# Patient Record
Sex: FEMALE | Race: WHITE | NOT HISPANIC OR LATINO | Employment: STUDENT | URBAN - METROPOLITAN AREA
[De-identification: names, ages, dates, MRNs, and addresses within clinical notes are randomized per-mention and may not be internally consistent; named-entity substitution may affect disease eponyms.]

---

## 2023-11-27 ENCOUNTER — MEDICAL CORRESPONDENCE (OUTPATIENT)
Dept: HEALTH INFORMATION MANAGEMENT | Facility: CLINIC | Age: 23
End: 2023-11-27
Payer: COMMERCIAL

## 2023-11-28 ENCOUNTER — OFFICE VISIT (OUTPATIENT)
Dept: NEUROLOGY | Facility: CLINIC | Age: 23
End: 2023-11-28
Attending: PHYSICIAN ASSISTANT
Payer: COMMERCIAL

## 2023-11-28 ENCOUNTER — TRANSCRIBE ORDERS (OUTPATIENT)
Dept: OTHER | Age: 23
End: 2023-11-28

## 2023-11-28 VITALS
OXYGEN SATURATION: 98 % | HEART RATE: 66 BPM | DIASTOLIC BLOOD PRESSURE: 68 MMHG | WEIGHT: 140 LBS | SYSTOLIC BLOOD PRESSURE: 104 MMHG

## 2023-11-28 DIAGNOSIS — G44.309 POST-CONCUSSION HEADACHE: ICD-10-CM

## 2023-11-28 DIAGNOSIS — G44.309 POST-CONCUSSION HEADACHE: Primary | ICD-10-CM

## 2023-11-28 PROCEDURE — 99205 OFFICE O/P NEW HI 60 MIN: CPT | Performed by: PSYCHIATRY & NEUROLOGY

## 2023-11-28 ASSESSMENT — HEADACHE IMPACT TEST (HIT 6)
WHEN YOU HAVE A HEADACHE HOW OFTEN DO YOU WISH YOU COULD LIE DOWN: ALWAYS
HOW OFTEN DO HEADACHES LIMIT YOUR DAILY ACTIVITIES: VERY OFTEN
HIT6 TOTAL SCORE: 67
HOW OFTEN HAVE YOU FELT FED UP OR IRRITATED BECAUSE OF YOUR HEADACHES: SOMETIMES
HOW OFTEN DID HEADACHS LIMIT CONCENTRATION ON WORK OR DAILY ACTIVITY: VERY OFTEN
HOW OFTEN HAVE YOU FELT TOO TIRED TO WORK BECAUSE OF YOUR HEADACHES: VERY OFTEN
WHEN YOU HAVE HEADACHES HOW OFTEN IS THE PAIN SEVERE: VERY OFTEN
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
## 2023-12-04 ENCOUNTER — HOSPITAL ENCOUNTER (EMERGENCY)
Facility: CLINIC | Age: 23
Discharge: HOME | End: 2023-12-04
Attending: STUDENT IN AN ORGANIZED HEALTH CARE EDUCATION/TRAINING PROGRAM
Payer: COMMERCIAL

## 2023-12-04 ENCOUNTER — APPOINTMENT (OUTPATIENT)
Dept: CT IMAGING | Facility: CLINIC | Age: 23
End: 2023-12-04
Payer: COMMERCIAL

## 2023-12-04 VITALS
TEMPERATURE: 98.3 F | OXYGEN SATURATION: 99 % | HEART RATE: 69 BPM | RESPIRATION RATE: 16 BRPM | DIASTOLIC BLOOD PRESSURE: 67 MMHG | SYSTOLIC BLOOD PRESSURE: 113 MMHG

## 2023-12-04 DIAGNOSIS — G43.709 CHRONIC MIGRAINE WITHOUT AURA WITHOUT STATUS MIGRAINOSUS, NOT INTRACTABLE: ICD-10-CM

## 2023-12-04 LAB
ALBUMIN SERPL BCG-MCNC: 5 G/DL (ref 3.5–5.2)
ALP SERPL-CCNC: 40 U/L (ref 40–150)
ALT SERPL W P-5'-P-CCNC: 14 U/L (ref 0–50)
ANION GAP SERPL CALCULATED.3IONS-SCNC: 11 MMOL/L (ref 7–15)
AST SERPL W P-5'-P-CCNC: 22 U/L (ref 0–45)
BASOPHILS # BLD AUTO: 0 10E3/UL (ref 0–0.2)
BASOPHILS NFR BLD AUTO: 1 %
BILIRUB SERPL-MCNC: 0.9 MG/DL
BUN SERPL-MCNC: 10.2 MG/DL (ref 6–20)
CALCIUM SERPL-MCNC: 9.7 MG/DL (ref 8.6–10)
CHLORIDE SERPL-SCNC: 106 MMOL/L (ref 98–107)
CREAT SERPL-MCNC: 0.74 MG/DL (ref 0.51–0.95)
DEPRECATED HCO3 PLAS-SCNC: 26 MMOL/L (ref 22–29)
EGFRCR SERPLBLD CKD-EPI 2021: >90 ML/MIN/1.73M2
EOSINOPHIL # BLD AUTO: 0.1 10E3/UL (ref 0–0.7)
EOSINOPHIL NFR BLD AUTO: 2 %
ERYTHROCYTE [DISTWIDTH] IN BLOOD BY AUTOMATED COUNT: 12.3 % (ref 10–15)
GLUCOSE SERPL-MCNC: 91 MG/DL (ref 70–99)
HCG INTACT+B SERPL-ACNC: <1 MIU/ML
HCT VFR BLD AUTO: 43.1 % (ref 35–47)
HGB BLD-MCNC: 14.5 G/DL (ref 11.7–15.7)
IMM GRANULOCYTES # BLD: 0 10E3/UL
IMM GRANULOCYTES NFR BLD: 0 %
LYMPHOCYTES # BLD AUTO: 2.1 10E3/UL (ref 0.8–5.3)
LYMPHOCYTES NFR BLD AUTO: 31 %
MAGNESIUM SERPL-MCNC: 2.2 MG/DL (ref 1.7–2.3)
MCH RBC QN AUTO: 30.4 PG (ref 26.5–33)
MCHC RBC AUTO-ENTMCNC: 33.6 G/DL (ref 31.5–36.5)
MCV RBC AUTO: 90 FL (ref 78–100)
MONOCYTES # BLD AUTO: 0.4 10E3/UL (ref 0–1.3)
MONOCYTES NFR BLD AUTO: 7 %
NEUTROPHILS # BLD AUTO: 4 10E3/UL (ref 1.6–8.3)
NEUTROPHILS NFR BLD AUTO: 59 %
NRBC # BLD AUTO: 0 10E3/UL
NRBC BLD AUTO-RTO: 0 /100
PLATELET # BLD AUTO: 269 10E3/UL (ref 150–450)
POTASSIUM SERPL-SCNC: 3.7 MMOL/L (ref 3.4–5.3)
PROT SERPL-MCNC: 7.6 G/DL (ref 6.4–8.3)
RBC # BLD AUTO: 4.77 10E6/UL (ref 3.8–5.2)
SODIUM SERPL-SCNC: 143 MMOL/L (ref 135–145)
WBC # BLD AUTO: 6.7 10E3/UL (ref 4–11)

## 2023-12-04 PROCEDURE — 84702 CHORIONIC GONADOTROPIN TEST: CPT

## 2023-12-04 PROCEDURE — 83735 ASSAY OF MAGNESIUM: CPT

## 2023-12-04 PROCEDURE — 85025 COMPLETE CBC W/AUTO DIFF WBC: CPT

## 2023-12-04 PROCEDURE — 96375 TX/PRO/DX INJ NEW DRUG ADDON: CPT | Mod: 59

## 2023-12-04 PROCEDURE — 36415 COLL VENOUS BLD VENIPUNCTURE: CPT

## 2023-12-04 PROCEDURE — 99284 EMERGENCY DEPT VISIT MOD MDM: CPT

## 2023-12-04 PROCEDURE — 250N000011 HC RX IP 250 OP 636: Performed by: STUDENT IN AN ORGANIZED HEALTH CARE EDUCATION/TRAINING PROGRAM

## 2023-12-04 PROCEDURE — 70496 CT ANGIOGRAPHY HEAD: CPT

## 2023-12-04 PROCEDURE — 250N000013 HC RX MED GY IP 250 OP 250 PS 637

## 2023-12-04 PROCEDURE — 82310 ASSAY OF CALCIUM: CPT

## 2023-12-04 PROCEDURE — 250N000011 HC RX IP 250 OP 636

## 2023-12-04 PROCEDURE — 82374 ASSAY BLOOD CARBON DIOXIDE: CPT

## 2023-12-04 PROCEDURE — 70496 CT ANGIOGRAPHY HEAD: CPT | Mod: 26 | Performed by: RADIOLOGY

## 2023-12-04 PROCEDURE — 96374 THER/PROPH/DIAG INJ IV PUSH: CPT | Mod: 59

## 2023-12-04 PROCEDURE — 96361 HYDRATE IV INFUSION ADD-ON: CPT

## 2023-12-04 PROCEDURE — 258N000003 HC RX IP 258 OP 636

## 2023-12-04 PROCEDURE — 70450 CT HEAD/BRAIN W/O DYE: CPT

## 2023-12-04 PROCEDURE — 99285 EMERGENCY DEPT VISIT HI MDM: CPT | Mod: 25

## 2023-12-04 PROCEDURE — 70496 CT ANGIOGRAPHY HEAD: CPT | Mod: XS

## 2023-12-04 RX ORDER — DIPHENHYDRAMINE HYDROCHLORIDE 50 MG/ML
25 INJECTION INTRAMUSCULAR; INTRAVENOUS ONCE
Status: COMPLETED | OUTPATIENT
Start: 2023-12-04 | End: 2023-12-04

## 2023-12-04 RX ORDER — ACETAMINOPHEN 500 MG
1000 TABLET ORAL ONCE
Status: COMPLETED | OUTPATIENT
Start: 2023-12-04 | End: 2023-12-04

## 2023-12-04 RX ORDER — IOPAMIDOL 755 MG/ML
90 INJECTION, SOLUTION INTRAVASCULAR ONCE
Status: COMPLETED | OUTPATIENT
Start: 2023-12-04 | End: 2023-12-04

## 2023-12-04 RX ADMIN — DIPHENHYDRAMINE HYDROCHLORIDE 25 MG: 50 INJECTION, SOLUTION INTRAMUSCULAR; INTRAVENOUS at 13:03

## 2023-12-04 RX ADMIN — ACETAMINOPHEN 1000 MG: 325 TABLET, FILM COATED ORAL at 12:56

## 2023-12-04 RX ADMIN — IOPAMIDOL 67 ML: 755 INJECTION, SOLUTION INTRAVENOUS at 14:33

## 2023-12-04 RX ADMIN — PROCHLORPERAZINE EDISYLATE 10 MG: 5 INJECTION INTRAMUSCULAR; INTRAVENOUS at 13:01

## 2023-12-04 RX ADMIN — SODIUM CHLORIDE 1000 ML: 9 INJECTION, SOLUTION INTRAVENOUS at 13:00

## 2023-12-04 ASSESSMENT — ACTIVITIES OF DAILY LIVING (ADL)
ADLS_ACUITY_SCORE: 35
ADLS_ACUITY_SCORE: 35

## 2023-12-04 NOTE — ED TRIAGE NOTES
Pt arrives ambulatory to with complaint of migraine on left side of head behind eye - last night reports that her vision was more spotty, has since resolved but not 100%.     Hx: most recent concussion in July (total of 6) - has constant headaches. Recently has been getting worse overnight.

## 2023-12-04 NOTE — ED PROVIDER NOTES
ED Provider Note  [facility]      History     Chief Complaint   Patient presents with    Headache     The history is provided by the patient. No  was used.     Kavita Hansen is a 23 year old female who presents to the ED with complaints of worsening left-sided migraine headache.  Past medical history notable for sixth diagnosed concussion in 7/4/2023.  She states that she has had a chronic left-sided headache since this head injury.  At that time, she got hit on the left side of her head by a volleyball which caused her concussion.  She reports worsening over the past 4 weeks with significant, severe worsening since 11 PM last night.  She contacted her neurologist who recommended she seek evaluation in the ED for worsening migraine headache.  She reports bilateral temporal vision loss since the incident in July with some worsening over the past couple of days.  She also reports chronic, left inferior decrease in vision since the head injury in July.  She otherwise denies any acute vision loss or blurry vision.  She does not use corrective lenses or contacts.  She denies any nausea, vomiting, fever, or chills.  She denies ear fullness, balance issues, is otherwise oriented x 4 and able to ambulate and move her extremities at baseline.  She denies any numbness, tingling, weakness into her upper or lower extremities.  She last had Advil yesterday with no improvement of her symptoms.    Past Medical History  No past medical history on file.  No past surgical history on file.  No current outpatient medications on file.    Allergies   Allergen Reactions    Amoxicillin Hives     Family History  No family history on file.  Social History   Social History     Tobacco Use    Smoking status: Never    Smokeless tobacco: Never   Substance Use Topics    Alcohol use: Never    Drug use: Never      Past medical history, past surgical history, medications, allergies, family history,  and social history were reviewed with the patient. No additional pertinent items.      A medically appropriate review of systems was performed with pertinent positives and negatives noted in the HPI, and all other systems negative.    Physical Exam   BP: 106/66  Pulse: 78  Temp: 98.3  F (36.8  C)  Resp: 18  SpO2: 97 %  Physical Exam  Constitutional:       General: She is not in acute distress.     Appearance: Normal appearance. She is normal weight. She is not ill-appearing, toxic-appearing or diaphoretic.   HENT:      Head: Normocephalic.      Right Ear: Tympanic membrane and ear canal normal. There is no impacted cerumen.      Left Ear: Tympanic membrane and ear canal normal. There is no impacted cerumen.      Mouth/Throat:      Mouth: Mucous membranes are moist.      Pharynx: Oropharynx is clear.   Eyes:      Extraocular Movements: Extraocular movements intact.      Pupils: Pupils are equal, round, and reactive to light.   Cardiovascular:      Rate and Rhythm: Normal rate and regular rhythm.      Pulses: Normal pulses.   Pulmonary:      Effort: Pulmonary effort is normal.      Breath sounds: Normal breath sounds.   Abdominal:      General: Abdomen is flat.      Palpations: Abdomen is soft.   Musculoskeletal:         General: Normal range of motion.   Skin:     General: Skin is warm.      Capillary Refill: Capillary refill takes less than 2 seconds.   Neurological:      General: No focal deficit present.      Mental Status: She is alert and oriented to person, place, and time. Mental status is at baseline.      Cranial Nerves: No cranial nerve deficit.      Sensory: No sensory deficit.      Motor: No weakness.   Psychiatric:         Mood and Affect: Mood normal.         Behavior: Behavior normal.           ED Course, Procedures, & Data      Procedures                Results for orders placed or performed during the hospital encounter of 12/04/23   CT Head w/o Contrast     Status: None    Narrative    CT HEAD W/O  CONTRAST 12/4/2023 3:11 PM    History: worsening migraine headache     Comparison: none available     Technique: Using multidetector thin collimation helical acquisition  technique, axial, coronal and sagittal CT images from the skull base  to the vertex were obtained without intravenous contrast.   (topogram) image(s) also obtained and reviewed.    Findings:   There is no intracranial hemorrhage, mass effect, or midline shift.  Gray/white matter differentiation in both cerebral hemispheres is  preserved. Ventricles are proportionate to the cerebral sulci. The  basal cisterns are clear.    The bony calvaria and the bones of the skull base are normal. The  visualized portions of the paranasal sinuses and mastoid air cells are  clear.       Impression    Impression: No acute intracranial pathology.     I have personally reviewed the examination and initial interpretation  and I agree with the findings.    TERRELL WEST MD         SYSTEM ID:  [id number]   CTA Head with Contrast     Status: None    Narrative    CTA HEAD WITH CONTRAST, CTV HEAD WITH CONTRAST 12/4/2023 3:20 PM    CT angiogram of the head with contrast  Reconstruction on the 3D workstation    History: worsening headache; bilateral temporal vision changes;  concussion in July    Comparison:  same day head CT    Technique:     Following rapid bolus intravenous injection of nonionic contrast  material, helical images were obtained using thin collimation  multidetector helical technique from the base of the skull through the  [place]. This CT angiogram data was reconstructed at thin  intervals with mild overlap. Images were sent to the [employer]  workstation, and 3D and multiplanar reconstructions were performed by  the technologist. The source images, multiplanar reformations, and 3D  reconstructions in both maximum intensity projection display and  volume rendered models were reviewed.    Post intravenous contrast imaging was obtained with thin  sections from  the skull base through the vertex with a delay for venogram purposes.  Images were reviewed on the 3D workstation and manipulated.    Findings:      No aneurysm or stenosis of the major intracranial arteries at the base  of the brain. Anterior communicating artery is patent. Bilateral  posterior communicating arteries are not visualized.    Head CTV demonstrates no definite occlusion or thrombus within the  major dural and deep intracranial venous sinuses.      Impression    Impression:     1. No aneurysm or stenosis of the major intracranial arteries.  2. Normal CT venogram of the head.    I have personally reviewed the examination and initial interpretation  and I agree with the findings.    TERRELL WEST MD         SYSTEM ID:  [id number]   CTV Head with Contrast     Status: None    Narrative    CTA HEAD WITH CONTRAST, CTV HEAD WITH CONTRAST 12/4/2023 3:20 PM    CT angiogram of the head with contrast  Reconstruction on the 3D workstation    History: worsening headache; bilateral temporal vision changes;  concussion in July    Comparison:  same day head CT    Technique:     Following rapid bolus intravenous injection of nonionic contrast  material, helical images were obtained using thin collimation  multidetector helical technique from the base of the skull through the  [place]. This CT angiogram data was reconstructed at thin  intervals with mild overlap. Images were sent to the [employer]  workstation, and 3D and multiplanar reconstructions were performed by  the technologist. The source images, multiplanar reformations, and 3D  reconstructions in both maximum intensity projection display and  volume rendered models were reviewed.    Post intravenous contrast imaging was obtained with thin sections from  the skull base through the vertex with a delay for venogram purposes.  Images were reviewed on the 3D workstation and manipulated.    Findings:      No aneurysm or stenosis of the major  intracranial arteries at the base  of the brain. Anterior communicating artery is patent. Bilateral  posterior communicating arteries are not visualized.    Head CTV demonstrates no definite occlusion or thrombus within the  major dural and deep intracranial venous sinuses.      Impression    Impression:     1. No aneurysm or stenosis of the major intracranial arteries.  2. Normal CT venogram of the head.    I have personally reviewed the examination and initial interpretation  and I agree with the findings.    TERRELL WEST MD         SYSTEM ID:  [id number]   Comprehensive metabolic panel     Status: Normal   Result Value Ref Range    Sodium 143 135 - 145 mmol/L    Potassium 3.7 3.4 - 5.3 mmol/L    Carbon Dioxide (CO2) 26 22 - 29 mmol/L    Anion Gap 11 7 - 15 mmol/L    Urea Nitrogen 10.2 6.0 - 20.0 mg/dL    Creatinine 0.74 0.51 - 0.95 mg/dL    GFR Estimate >90 >60 mL/min/1.73m2    Calcium 9.7 8.6 - 10.0 mg/dL    Chloride 106 98 - 107 mmol/L    Glucose 91 70 - 99 mg/dL    Alkaline Phosphatase 40 40 - 150 U/L    AST 22 0 - 45 U/L    ALT 14 0 - 50 U/L    Protein Total 7.6 6.4 - 8.3 g/dL    Albumin 5.0 3.5 - 5.2 g/dL    Bilirubin Total 0.9 <=1.2 mg/dL   HCG quantitative pregnancy     Status: Normal   Result Value Ref Range    hCG Quantitative <1 <5 mIU/mL   Magnesium     Status: Normal   Result Value Ref Range    Magnesium 2.2 1.7 - 2.3 mg/dL   CBC with platelets and differential     Status: None   Result Value Ref Range    WBC Count 6.7 4.0 - 11.0 10e3/uL    RBC Count 4.77 3.80 - 5.20 10e6/uL    Hemoglobin 14.5 11.7 - 15.7 g/dL    Hematocrit 43.1 35.0 - 47.0 %    MCV 90 78 - 100 fL    MCH 30.4 26.5 - 33.0 pg    MCHC 33.6 31.5 - 36.5 g/dL    RDW 12.3 10.0 - 15.0 %    Platelet Count 269 150 - 450 10e3/uL    % Neutrophils 59 %    % Lymphocytes 31 %    % Monocytes 7 %    % Eosinophils 2 %    % Basophils 1 %    % Immature Granulocytes 0 %    NRBCs per 100 WBC 0 <1 /100    Absolute Neutrophils 4.0 1.6 - 8.3 10e3/uL     Absolute Lymphocytes 2.1 0.8 - 5.3 10e3/uL    Absolute Monocytes 0.4 0.0 - 1.3 10e3/uL    Absolute Eosinophils 0.1 0.0 - 0.7 10e3/uL    Absolute Basophils 0.0 0.0 - 0.2 10e3/uL    Absolute Immature Granulocytes 0.0 <=0.4 10e3/uL    Absolute NRBCs 0.0 10e3/uL   CBC with platelets differential     Status: None    Narrative    The following orders were created for panel order CBC with platelets differential.  Procedure                               Abnormality         Status                     ---------                               -----------         ------                     CBC with platelets and d...[[id number]]                      Final result                 Please view results for these tests on the individual orders.     Medications   sodium chloride 0.9% BOLUS 1,000 mL (0 mLs Intravenous Stopped 12/4/23 1508)   diphenhydrAMINE (BENADRYL) injection 25 mg (25 mg Intravenous $Given 12/4/23 1303)   prochlorperazine (COMPAZINE) injection 10 mg (10 mg Intravenous $Given 12/4/23 1301)   acetaminophen (TYLENOL) tablet 1,000 mg (1,000 mg Oral $Given 12/4/23 1256)   iopamidol (ISOVUE-370) solution 90 mL (67 mLs Intravenous $Given 12/4/23 1433)   sodium chloride (PF) 0.9% PF flush 90 mL (90 mLs Intravenous $Given 12/4/23 1433)     Labs Ordered and Resulted from Time of ED Arrival to Time of ED Departure   COMPREHENSIVE METABOLIC PANEL - Normal       Result Value    Sodium 143      Potassium 3.7      Carbon Dioxide (CO2) 26      Anion Gap 11      Urea Nitrogen 10.2      Creatinine 0.74      GFR Estimate >90      Calcium 9.7      Chloride 106      Glucose 91      Alkaline Phosphatase 40      AST 22      ALT 14      Protein Total 7.6      Albumin 5.0      Bilirubin Total 0.9     HCG QUANTITATIVE PREGNANCY - Normal    hCG Quantitative <1     MAGNESIUM - Normal    Magnesium 2.2     CBC WITH PLATELETS AND DIFFERENTIAL    WBC Count 6.7      RBC Count 4.77      Hemoglobin 14.5      Hematocrit 43.1      MCV 90      MCH 30.4       MCHC 33.6      RDW 12.3      Platelet Count 269      % Neutrophils 59      % Lymphocytes 31      % Monocytes 7      % Eosinophils 2      % Basophils 1      % Immature Granulocytes 0      NRBCs per 100 WBC 0      Absolute Neutrophils 4.0      Absolute Lymphocytes 2.1      Absolute Monocytes 0.4      Absolute Eosinophils 0.1      Absolute Basophils 0.0      Absolute Immature Granulocytes 0.0      Absolute NRBCs 0.0       CTV Head with Contrast   Final Result   Impression:       1. No aneurysm or stenosis of the major intracranial arteries.   2. Normal CT venogram of the head.      I have personally reviewed the examination and initial interpretation   and I agree with the findings.      TERRELL WEST MD            SYSTEM ID:  [id number]      CTA Head with Contrast   Final Result   Impression:       1. No aneurysm or stenosis of the major intracranial arteries.   2. Normal CT venogram of the head.      I have personally reviewed the examination and initial interpretation   and I agree with the findings.      TERRELL WEST MD            SYSTEM ID:  [id number]      CT Head w/o Contrast   Final Result   Impression: No acute intracranial pathology.       I have personally reviewed the examination and initial interpretation   and I agree with the findings.      TERRELL WEST MD            SYSTEM ID:  [id number]             Critical care was not performed.     Medical Decision Making  The patient's presentation was of moderate complexity (a chronic illness mild to moderate exacerbation, progression, or side effect of treatment).    The patient's evaluation involved:  ordering and/or review of 3+ test(s) in this encounter (see separate area of note for details)    The patient's management necessitated moderate risk (prescription drug management including medications given in the ED).    Assessment & Plan    Kavita is a 23-year-old female that presented for worsening left-sided migraine headache.  Acceptable vital signs.   Patient in no acute distress nontoxic-appearing.  No neurodeficits on exam.  Oriented x 4.  No gait disturbance or balance issues.  Labs unremarkable within normal limits.  hCG negative.  CT head without contrast negative for acute intracranial abnormalities.  CTA and CTV head for aneurysm or stenosis of the major intracranial arteries as well as normal CT venogram of the head.  Patient had improvement of headache symptoms with IV NS bolus, IV Benadryl, IV Compazine, and p.o. Tylenol.  She continued to report bilateral temporal peripheral vision changes but this has been ongoing since her diagnosed concussion in July 2023.  No urgent or emergent process on imaging or lab work at this time in the ED.  Patient stable for discharge home.  Strict return precautions given.  Continue at home medication regimen for headache.  Follow-up closely with your neurologist this week for further discussion of symptom management as well as imaging that is scheduled for this Saturday.  Recommend proceeding with MRI and MRV of the head on Saturday unless otherwise told by your neurologist.  Patient was agreeable to the discharge treatment plan, voiced understanding of the plan as well as imaging and lab results, and all questions answered prior to discharge    I have reviewed the nursing notes. I have reviewed the findings, diagnosis, plan and need for follow up with the patient.    There are no discharge medications for this patient.      Final diagnoses:   Chronic migraine without aura without status migrainosus, not intractable     EMILY Jackson MD  [facility] EMERGENCY DEPARTMENT  12/4/2023     Nya Cai PA-C  12/04/23 2048    --    ED Attending Physician Attestation    I Cherry Long MD, cared for this patient with the Advanced Practice Provider (JANIE). I have performed a history and physical examination of the patient independent of the JANEI. I reviewed the JANIE's documentation above  and agree with the documented findings and plan of care. I personally provided a substantive portion of the care for this patient, including the complete Medical Decision Making. Please see the JANIE's documentation for full details.      Cherry Long MD  Emergency Medicine        Cherry Long MD  12/05/23 1005

## 2023-12-04 NOTE — DISCHARGE INSTRUCTIONS
Continue at home symptomatic treatment for your migraine headache  Touch base with your neurologist this week to discuss further management of your chronic migraine headache  Proceed with imaging on Saturday as scheduled or if directed differently from your neurologist  Return to the ED if any worsening or concerning signs or symptoms present

## 2023-12-09 ENCOUNTER — HOSPITAL ENCOUNTER (OUTPATIENT)
Dept: MRI IMAGING | Facility: CLINIC | Age: 23
Discharge: HOME | End: 2023-12-09
Attending: PSYCHIATRY & NEUROLOGY | Admitting: PSYCHIATRY & NEUROLOGY
Payer: COMMERCIAL

## 2023-12-09 DIAGNOSIS — G44.309 POST-CONCUSSION HEADACHE: ICD-10-CM

## 2023-12-09 PROCEDURE — 255N000002 HC RX 255 OP 636: Mod: JZ | Performed by: PSYCHIATRY & NEUROLOGY

## 2023-12-09 PROCEDURE — 70553 MRI BRAIN STEM W/O & W/DYE: CPT | Mod: 26 | Performed by: RADIOLOGY

## 2023-12-09 PROCEDURE — 70546 MR ANGIOGRAPH HEAD W/O&W/DYE: CPT

## 2023-12-09 PROCEDURE — 70553 MRI BRAIN STEM W/O & W/DYE: CPT

## 2023-12-09 PROCEDURE — A9585 GADOBUTROL INJECTION: HCPCS | Mod: JZ | Performed by: PSYCHIATRY & NEUROLOGY

## 2023-12-09 RX ORDER — GADOBUTROL 604.72 MG/ML
0.1 INJECTION INTRAVENOUS ONCE
Status: COMPLETED | OUTPATIENT
Start: 2023-12-09 | End: 2023-12-09

## 2023-12-09 RX ADMIN — GADOBUTROL 6.35 ML: 604.72 INJECTION INTRAVENOUS at 07:49

## 2023-12-12 ENCOUNTER — OFFICE VISIT (OUTPATIENT)
Dept: PHYSICAL MEDICINE AND REHAB | Facility: CLINIC | Age: 23
End: 2023-12-12
Payer: COMMERCIAL

## 2023-12-12 DIAGNOSIS — G44.309 POST-CONCUSSION HEADACHE: ICD-10-CM

## 2023-12-12 DIAGNOSIS — S06.0XAA CONCUSSION WITH UNKNOWN LOSS OF CONSCIOUSNESS STATUS, INITIAL ENCOUNTER: Primary | ICD-10-CM

## 2023-12-12 PROCEDURE — 99205 OFFICE O/P NEW HI 60 MIN: CPT | Performed by: PHYSICAL MEDICINE & REHABILITATION

## 2023-12-12 PROCEDURE — 99417 PROLNG OP E/M EACH 15 MIN: CPT | Performed by: PHYSICAL MEDICINE & REHABILITATION

## 2023-12-12 NOTE — PROGRESS NOTES
.       PM&R Clinic Note     Patient Name: Kavita Hansen : 2000 Medical Record: [id number]     Requesting Physician/clinician: No att. providers found           History of Present Illness:     Kavita Hansen is a 23 year old female referred for concussion evaluation.    Per ED notes 23: HPI: Kavita Hansen is a 22 y.o. female with a pmhx of multiple concussions (6x concussion) who presents to the ED with a chief complaint of dizziness. Patient reports that on 2023 she was playing volleyball when someone bumped the ball which subsequently struck her in the face. She denied any LOC. Pt says that ever since she has had ongoing headaches, memory issues, vision problems, and dizziness. Pt has been taking ibuprofen and notes that she has been following up with a retinal specialist for her vision problems. Pt states that three days ago she took Excedrin and says that two hours later she developed nausea, abdominal pain, heart palpitations, and paresthesia in her bilateral hands. Pt says that she was sitting and watching TV at this time. Pt denies having any history of migraines but notes that she has had multiple concussions in the past. No other medical complaints reported.     Today, patient reports the following:   Left vision loss: seen by retinal specialist and was reassured. Has not changed. No reports of functional concerns. Neurology evaluated.  Memory issues: reports difficulty concentrating at times. Difficulty with sleep initiation for unclear reasons. No reports of snoring. No nightmares     Functionally, independent with ADLs and iADLs    H/O 6 concussions            Past Medical and Surgical History:     No past medical history on file.  No past surgical history on file.         Social History:     Social History     Tobacco Use    Smoking status: Never    Smokeless tobacco: Never   Substance Use Topics    Alcohol use: Never            Functional history:     ADLs: as above  iADLs  (medication management and finances): as above         Family History:     No family history on file.         Medications:     No current outpatient medications on file.            Allergies:     Allergies   Allergen Reactions    Amoxicillin Hives              ROS:     A focused ROS is negative other than the symptoms noted above in the HPI.         Physical Examiniation:     VITAL SIGNS: There were no vitals taken for this visit.  BMI: There is no height or weight on file to calculate BMI.    Gen: NAD, pleasant and cooperative   Neuro/MSK:   Normal complete neuro exam   No [facility] signs identified         Laboratory/Imaging:     History:  TBI symptoms, history of 6 total lifetime concussions,  persistent headache and known retinal injury on the left eye;  Post-concussion headache.  ICD-10: Post-concussion headache     Comparison:  Head CTA and CTV from 12/4/2023     Technique:   Head MRI: Noncontrast T1-weighted, T2-weighted, FLAIR,  diffusion-weighted, and susceptibility weighted images of the brain  obtained. Postcontrast T1 weighted images were obtained after  gadolinium-based intravenous contrast administration.  Head MRV: 2D time-of-flight MR venogram (MRV) of the head was  performed without intravenous contrast. Following intravenous  gadolinium-based contrast administration, a contrast enhanced MRV of  the intracranial vessels was performed.     Contrast: 6.3ml gadavist      Findings:   Head MRI:   There is no mass affect, midline shift, or evidence of intracranial  hemorrhage.  The ventricles are not enlarged out of proportion to the  cerebral sulci. The gray-white matter differentiation of the cerebral  hemispheres is preserved. Postcontrast images demonstrate no abnormal  intracranial parenchymal or meningeal enhancement. The orbits,  visualized portions of paranasal sinuses, and mastoid air cells are  relatively clear.      Head MRV:  No definite thrombosis or stenosis of the major intracranial  dural  sinuses or deep cerebral veins. Postcontrast MRV images do not  demonstrate any definite intraluminal filling defect.                                                                      Impression:  1, Head MRI demonstrates no acute intracranial pathology or focal  abnormality.  2. Head MRV demonstrates patent major dural and deep venous sinuses  intracranially.           Assessment/Plan:     Post-concussion headache  1. Post-concussion headache  - Adult Physical Medicine and Rehab  Referral      Patient education: In depth discussion and education was provided about the assessment and implications of each of the below recommendations for management. Patient indicated readiness to learn, all questions were answered and understanding of material presented was confirmed.    Work-up: none needed at this time    Therapy/equipment/braces: vision therapy    Medications: none needed at this time    Interventions: none needed at this time    Referral / follow up with other providers: neuropsychology to evaluate cognition    Follow up: after neuropsychology     Yojana Rodriguez MD  Physical Medicine & Rehabilitation    80 minutes spent on the date of the encounter reviewing EPIC notes including ED/UC notes, therapy notes, family care notes, care-everywhere, labs and history and exam documentation. I personally reviewed the image and further activities as noted above.

## 2023-12-12 NOTE — LETTER
2023         RE: Kavita Hansen  [address]  [facility] [address]        Dear Colleague,    Thank you for referring your patient, Kavita Hansen, to the [facility]. Please see a copy of my visit note below.    .       PM&R Clinic Note     Patient Name: Kavita Hansen : 2000 Medical Record: [id number]     Requesting Physician/clinician: No att. providers found           History of Present Illness:     Kavita Hansen is a 23 year old female referred for concussion evaluation.    Per ED notes 23: HPI: Kavita Hansen is a 22 y.o. female with a pmhx of multiple concussions (6x concussion) who presents to the ED with a chief complaint of dizziness. Patient reports that on 2023 she was playing volleyball when someone bumped the ball which subsequently struck her in the face. She denied any LOC. Pt says that ever since she has had ongoing headaches, memory issues, vision problems, and dizziness. Pt has been taking ibuprofen and notes that she has been following up with a retinal specialist for her vision problems. Pt states that three days ago she took Excedrin and says that two hours later she developed nausea, abdominal pain, heart palpitations, and paresthesia in her bilateral hands. Pt says that she was sitting and watching TV at this time. Pt denies having any history of migraines but notes that she has had multiple concussions in the past. No other medical complaints reported.     Today, patient reports the following:   Left vision loss: seen by retinal specialist and was reassured. Has not changed. No reports of functional concerns. Neurology evaluated.  Memory issues: reports difficulty concentrating at times. Difficulty with sleep initiation for unclear reasons. No reports of snoring. No nightmares     Functionally, independent with ADLs and iADLs    H/O 6 concussions            Past Medical and Surgical History:     No past medical history on  file.  No past surgical history on file.         Social History:     Social History     Tobacco Use     Smoking status: Never     Smokeless tobacco: Never   Substance Use Topics     Alcohol use: Never            Functional history:     ADLs: as above  iADLs (medication management and finances): as above         Family History:     No family history on file.         Medications:     No current outpatient medications on file.            Allergies:     Allergies   Allergen Reactions     Amoxicillin Hives              ROS:     A focused ROS is negative other than the symptoms noted above in the HPI.         Physical Examiniation:     VITAL SIGNS: There were no vitals taken for this visit.  BMI: There is no height or weight on file to calculate BMI.    Gen: NAD, pleasant and cooperative   Neuro/MSK:   Normal complete neuro exam   No [facility] signs identified         Laboratory/Imaging:     History:  TBI symptoms, history of 6 total lifetime concussions,  persistent headache and known retinal injury on the left eye;  Post-concussion headache.  ICD-10: Post-concussion headache     Comparison:  Head CTA and CTV from 12/4/2023     Technique:   Head MRI: Noncontrast T1-weighted, T2-weighted, FLAIR,  diffusion-weighted, and susceptibility weighted images of the brain  obtained. Postcontrast T1 weighted images were obtained after  gadolinium-based intravenous contrast administration.  Head MRV: 2D time-of-flight MR venogram (MRV) of the head was  performed without intravenous contrast. Following intravenous  gadolinium-based contrast administration, a contrast enhanced MRV of  the intracranial vessels was performed.     Contrast: 6.3ml gadavist      Findings:   Head MRI:   There is no mass affect, midline shift, or evidence of intracranial  hemorrhage.  The ventricles are not enlarged out of proportion to the  cerebral sulci. The gray-white matter differentiation of the cerebral  hemispheres is preserved. Postcontrast images  demonstrate no abnormal  intracranial parenchymal or meningeal enhancement. The orbits,  visualized portions of paranasal sinuses, and mastoid air cells are  relatively clear.      Head MRV:  No definite thrombosis or stenosis of the major intracranial dural  sinuses or deep cerebral veins. Postcontrast MRV images do not  demonstrate any definite intraluminal filling defect.                                                                      Impression:  1, Head MRI demonstrates no acute intracranial pathology or focal  abnormality.  2. Head MRV demonstrates patent major dural and deep venous sinuses  intracranially.           Assessment/Plan:     Post-concussion headache  1. Post-concussion headache  - Adult Physical Medicine and Rehab  Referral      Patient education: In depth discussion and education was provided about the assessment and implications of each of the below recommendations for management. Patient indicated readiness to learn, all questions were answered and understanding of material presented was confirmed.    Work-up: none needed at this time    Therapy/equipment/braces: vision therapy    Medications: none needed at this time    Interventions: none needed at this time    Referral / follow up with other providers: neuropsychology to evaluate cognition    Follow up: after neuropsychology     Yojana Rodriguez MD  Physical Medicine & Rehabilitation    80 minutes spent on the date of the encounter reviewing EPIC notes including ED/UC notes, therapy notes, family care notes, care-everywhere, labs and history and exam documentation. I personally reviewed the image and further activities as noted above.            Again, thank you for allowing me to participate in the care of your patient.        Sincerely,        Yojana Rodriguez MD

## 2023-12-27 NOTE — RESULT ENCOUNTER NOTE
No findings such as venous sinus stenosis or cerebral venous sinus thrombosis to explain worsening of headaches. Will proceed with initiation of a preventative medication for headaches with migraine phenotype per our visit on 11/28/2023. I have reached out to my team to schedule a return phone visit with her.

## 2023-12-28 ENCOUNTER — TELEPHONE (OUTPATIENT)
Dept: NEUROLOGY | Facility: CLINIC | Age: 23
End: 2023-12-28
Payer: COMMERCIAL

## 2023-12-28 NOTE — TELEPHONE ENCOUNTER
[facility] Call Center    Phone Message    May a detailed message be left on voicemail: yes     Reason for Call: Other: Kavita calling back due to a missed call offering 1/5/24 at 3:00 pm. Maryjane would like to take that appointment and will confirm on [employer] after it has been updated.     Action Taken: Message routed to:  Other: [facility]    Travel Screening: Not Applicable

## 2023-12-28 NOTE — TELEPHONE ENCOUNTER
[facility] schedule a return phone visit with Dr. Borrero. Offered 01/05 @ 3 pm in [place] location for the phone visit. 3 pm ON HOLD.

## 2023-12-28 NOTE — TELEPHONE ENCOUNTER
Scheduled as directed, patient agreeable to appointment date and time.    Chel Valente MA  [facility]

## 2023-12-29 ASSESSMENT — HEADACHE IMPACT TEST (HIT 6)
HIT6 TOTAL SCORE: 63
HOW OFTEN DID HEADACHS LIMIT CONCENTRATION ON WORK OR DAILY ACTIVITY: VERY OFTEN
HOW OFTEN HAVE YOU FELT TOO TIRED TO WORK BECAUSE OF YOUR HEADACHES: SOMETIMES
WHEN YOU HAVE A HEADACHE HOW OFTEN DO YOU WISH YOU COULD LIE DOWN: VERY OFTEN
WHEN YOU HAVE HEADACHES HOW OFTEN IS THE PAIN SEVERE: SOMETIMES
HOW OFTEN DO HEADACHES LIMIT YOUR DAILY ACTIVITIES: SOMETIMES
HOW OFTEN HAVE YOU FELT FED UP OR IRRITATED BECAUSE OF YOUR HEADACHES: VERY OFTEN

## 2023-12-29 ASSESSMENT — MIGRAINE DISABILITY ASSESSMENT (MIDAS)
HOW MANY DAYS IN THE PAST 3 MONTHS HAVE YOU HAD A HEADACHE: 90
ON A SCALE FROM 0-10 ON AVERAGE HOW PAINFUL WERE HEADACHES: 5
HOW MANY DAYS DID YOU MISS WORK OR SCHOOL BECAUSE OF HEADACHES: 10
TOTAL SCORE: 24
HOW MANY DAYS WAS YOUR PRODUCTIVITY CUT IN HALF BECAUSE OF HEADACHES: 10
HOW MANY DAYS WAS HOUSEWORK PRODUCTIVITY CUT IN HALF DUE TO HEADACHES: 0
HOW MANY DAYS DID YOU NOT DO HOUSEWORK BECAUSE OF HEADACHES: 0
HOW OFTEN WERE SOCIAL ACTIVITIES MISSED DUE TO HEADACHES: 4

## 2023-12-31 ENCOUNTER — HEALTH MAINTENANCE LETTER (OUTPATIENT)
Age: 23
End: 2023-12-31

## 2024-01-05 ENCOUNTER — VIRTUAL VISIT (OUTPATIENT)
Dept: NEUROLOGY | Facility: CLINIC | Age: 24
End: 2024-01-05
Payer: COMMERCIAL

## 2024-01-05 VITALS — WEIGHT: 140 LBS | HEIGHT: 64 IN | BODY MASS INDEX: 23.9 KG/M2

## 2024-01-05 DIAGNOSIS — G43.009 MIGRAINE WITHOUT AURA AND WITHOUT STATUS MIGRAINOSUS, NOT INTRACTABLE: Primary | ICD-10-CM

## 2024-01-05 PROCEDURE — 99212 OFFICE O/P EST SF 10 MIN: CPT | Mod: 95 | Performed by: PSYCHIATRY & NEUROLOGY

## 2024-01-05 RX ORDER — AMITRIPTYLINE HYDROCHLORIDE 50 MG/1
50 TABLET ORAL AT BEDTIME
Qty: 30 TABLET | Refills: 3 | Status: SHIPPED | OUTPATIENT
Start: 2024-01-05

## 2024-01-05 RX ORDER — AMITRIPTYLINE HYDROCHLORIDE 10 MG/1
TABLET ORAL
Qty: 66 TABLET | Refills: 0 | Status: SHIPPED | OUTPATIENT
Start: 2024-01-05

## 2024-01-05 ASSESSMENT — PAIN SCALES - GENERAL: PAINLEVEL: MODERATE PAIN (4)

## 2024-01-05 NOTE — LETTER
1/5/2024         RE: Kavita Hansen  [address]  [place] [address]        Dear Colleague,    Thank you for referring your patient, Kavita Hansen, to the [facility]. Please see a copy of my visit note below.    Virtual Visit Details    Type of service:  Video Visit     Originating Location (pt. Location): Home    Distant Location (provider location):  On-site  Platform used for Video Visit: Paul      I briefly spoke to Ms. Hansen to check in on her receiving the results in my messages as well as her understanding the most common side effects of amitriptyline. This is an antidepressant which was found to be very effective for migraine pain due to the serotonergic and noradrenergic targets.   We discussed the risks I previously noted in my progress note from 11/28/2023 and she was given the opportunity for questions.    She should titrate as follows:    Week 1: 10mg bedtime  Week 2: 20mg bedtime  Week 3:30mg bedtime  Week 4:40mg bedtime  Week 5 and beyond: 50mg bedtime    I will meet back with her in 3 months to review efficacy.    Time spent today was 11 minutes.       Again, thank you for allowing me to participate in the care of your patient.        Sincerely,        Natty Borrero MD

## 2024-01-05 NOTE — NURSING NOTE
Is the patient currently in the state of [place]? YES    Visit mode:VIDEO    If the visit is dropped, the patient can be reconnected by: VIDEO VISIT: Text to cell phone:   Telephone Information:   Mobile [phone]       Will anyone else be joining the visit? NO  (If patient encounters technical issues they should call [phone]    How would you like to obtain your AVS? MyChart    Are changes needed to the allergy or medication list? No    Reason for visit: Follow Up    Jennifer MACKEY

## 2024-01-05 NOTE — PROGRESS NOTES
Virtual Visit Details    Type of service:  Video Visit     Originating Location (pt. Location): Home    Distant Location (provider location):  On-site  Platform used for Video Visit: Paul

## 2024-01-05 NOTE — PROGRESS NOTES
I briefly spoke to Ms. Hansen to check in on her receiving the results in my messages as well as her understanding the most common side effects of amitriptyline. This is an antidepressant which was found to be very effective for migraine pain due to the serotonergic and noradrenergic targets.   We discussed the risks I previously noted in my progress note from 11/28/2023 and she was given the opportunity for questions.    She should titrate as follows:    Week 1: 10mg bedtime  Week 2: 20mg bedtime  Week 3:30mg bedtime  Week 4:40mg bedtime  Week 5 and beyond: 50mg bedtime    I will meet back with her in 3 months to review efficacy.    Time spent today was 11 minutes.

## 2024-03-26 ENCOUNTER — OFFICE VISIT (OUTPATIENT)
Dept: NEUROPSYCHOLOGY | Facility: CLINIC | Age: 24
End: 2024-03-26
Attending: PHYSICAL MEDICINE & REHABILITATION
Payer: COMMERCIAL

## 2024-03-26 DIAGNOSIS — S06.0XAA CONCUSSION WITH UNKNOWN LOSS OF CONSCIOUSNESS STATUS, INITIAL ENCOUNTER: ICD-10-CM

## 2024-03-26 DIAGNOSIS — G31.84 MILD NEUROCOGNITIVE DISORDER: Primary | ICD-10-CM

## 2024-03-26 DIAGNOSIS — G44.309 POST-CONCUSSION HEADACHE: ICD-10-CM

## 2024-03-26 PROCEDURE — 96132 NRPSYC TST EVAL PHYS/QHP 1ST: CPT | Performed by: CLINICAL NEUROPSYCHOLOGIST

## 2024-03-26 PROCEDURE — 96116 NUBHVL XM PHYS/QHP 1ST HR: CPT | Performed by: CLINICAL NEUROPSYCHOLOGIST

## 2024-03-26 PROCEDURE — 96139 PSYCL/NRPSYC TST TECH EA: CPT | Performed by: CLINICAL NEUROPSYCHOLOGIST

## 2024-03-26 PROCEDURE — 96138 PSYCL/NRPSYC TECH 1ST: CPT | Performed by: CLINICAL NEUROPSYCHOLOGIST

## 2024-03-26 PROCEDURE — 96133 NRPSYC TST EVAL PHYS/QHP EA: CPT | Performed by: CLINICAL NEUROPSYCHOLOGIST

## 2024-03-26 NOTE — LETTER
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 patient data and test results, engaged in clinical decision making, treatment planning, report writing/preparation, and provided and documented interactive feedback of test results on the day of testing . A trained examiner/technician directly administered and scored 2+ neuropsychological tests. Please see below for a breakdown of time spent and the associated codes billed for these services.     Services   Time Spent  CPT Codes   Neurobehavioral Status Exam:  (e.g., face-to-face, interpretation, report) 35 minutes 1 x 96116   Neuropsychological Evaluation Services:   (e.g., integration, interpretation, treatment planning, clinical decision making, feedback)   135 minutes   1 x 96132  1 x 96133        Neuropsychological Testing by Trained Examiner/Technician:  (e.g., test administration, scoring, 2+ tests administered)   140 minutes   1 x 96138  4 x 96139     Diagnosis:  History of multiple concussions dating back to adolescence (most recent head injury in July 2023)  No cognitive diagnosis (no cognitive sequela from concussions)  Experience of cognitive inefficiencies in daily life most likely related to ongoing headaches (and vision difficulties)    For diagnostic and coding purposes, Ms.  Jade has a history of multiple concussions and persistent headache (since her most recent head injury in July 2023) and was referred for an evaluation of ild Neurocognitive Disorder. Feedback of results was provided on the day of testing.       Sera Velázquez, PhD, LP, [facility]  Clinical Neuropsychologist, [id number]  Board Certified in Clinical Neuropsychology    [facility] [address], [address]  [place] [address]  Phone:  [phone]    The patient was seen for a neuropsychological evaluation for the purposes of diagnostic clarification and treatment planning. 120 minutes of face-to-face testing were provided by this writer. An additional 40 minutes were spent scoring and compiling test results. The patient was cooperative with testing. No concerns were brought to my attention. Please see Dr. Velázquez's report for a detailed description of the charges and interpretation and integration of the findings.

## 2024-03-26 NOTE — PROGRESS NOTES
The patient was seen for a neuropsychological evaluation for the purposes of diagnostic clarification and treatment planning. 100 minutes of face-to-face testing were provided by this writer. An additional 40 minutes were spent scoring and compiling test results. The patient was cooperative with testing. No concerns were brought to my attention. Please see Dr. Velázquez's report for a detailed description of the charges and interpretation and integration of the findings.

## 2024-03-26 NOTE — PROGRESS NOTES
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 Please see below for a breakdown of time spent and the associated codes billed for these services.     Services   Time Spent  CPT Codes   Neurobehavioral Status Exam:  (e.g., face-to-face, interpretation, report) 35 minutes 1 x 96116   Neuropsychological Evaluation Services:   (e.g., integration, interpretation, treatment planning, clinical decision making, feedback)   135 minutes   1 x 96132  1 x 96133        Neuropsychological Testing by Trained Examiner/Technician:  (e.g., test administration, scoring, 2+ tests administered)   140 minutes   1 x 96138  4 x 96139     Diagnosis:  History of multiple concussions dating back to adolescence (most recent head injury in July 2023)  No cognitive diagnosis (no cognitive sequela from concussions)  Experience of cognitive inefficiencies in daily life most likely related to ongoing headaches (and vision difficulties)    For diagnostic and coding purposes, Ms. 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 Feedback of results was provided on the day of testing.       Sera Velázquez, PhD, LP, ABPP  Clinical Neuropsychologist,  DIRK#[id number]  Board Certified in Clinical Neuropsychology    [facility]  [address] [place] [address], [address]  [place] [address]  Phone:  [phone]

## 2024-03-27 NOTE — PATIENT INSTRUCTIONS
DIAGNOSTIC IMPRESSIONS (from 3/26/2024 Neuropsychology Consult):  History of multiple concussions dating back to adolescence (most recent head injury in July 2023)  No cognitive diagnosis (no cognitive sequela from concussions)  Experience of cognitive inefficiencies in daily life most likely related to ongoing headaches (and vision difficulties)     RECOMMENDATIONS:  1) Time was spent discussing the pathophysiology of concussion injury, normalizing Ms. Hansen's experience, and providing education about the anticipated recovery trajectory. She was open to education about the fact that people recover from mild traumatic brain injuries and that any cognitive inefficiencies are not related to her history of concussions, but rather to her ongoing headaches.  Once her headaches improve, her day-to-day cognitive functioning should return to baseline. She will benefit from hearing this clear and consistent message about recovery from concussion.     2) We discussed how physical, emotional, and cognitive symptoms are highly interconnected and influence one another.  Ms. Hansen seemed to have a good appreciation of this, acknowledging that headaches often interfere with her quality of sleep, for example. We discussed further how poor sleep can lead to inattention and how headaches can also make it hard to pay attention and in turn to learn and retain new information.     3) As she continues to struggle with headaches, Ms. Hansen would likely benefit from the use of compensatory and organizational strategies to optimize her functioning in daily life. These include utilizing note pads, checklists, to-do lists, a calendar/planner, alarm reminders, a GPS, and maintaining a daily routine and an organized living/work environment.    4) No further follow-up with Neuropsychology is needed. However, should concerns about cognition arise in the future, current results can be used as a baseline for comparison.

## 2025-01-19 ENCOUNTER — HEALTH MAINTENANCE LETTER (OUTPATIENT)
Age: 25
End: 2025-01-19